# Patient Record
Sex: MALE | Race: AMERICAN INDIAN OR ALASKA NATIVE
[De-identification: names, ages, dates, MRNs, and addresses within clinical notes are randomized per-mention and may not be internally consistent; named-entity substitution may affect disease eponyms.]

---

## 2018-05-07 ENCOUNTER — HOSPITAL ENCOUNTER (EMERGENCY)
Dept: HOSPITAL 5 - ED | Age: 53
LOS: 1 days | Discharge: HOME | End: 2018-05-08
Payer: SELF-PAY

## 2018-05-07 DIAGNOSIS — I10: Primary | ICD-10-CM

## 2018-05-07 DIAGNOSIS — F17.200: ICD-10-CM

## 2018-05-07 PROCEDURE — 99284 EMERGENCY DEPT VISIT MOD MDM: CPT

## 2018-05-07 PROCEDURE — 70450 CT HEAD/BRAIN W/O DYE: CPT

## 2018-05-07 PROCEDURE — 81001 URINALYSIS AUTO W/SCOPE: CPT

## 2018-05-08 VITALS — DIASTOLIC BLOOD PRESSURE: 104 MMHG | SYSTOLIC BLOOD PRESSURE: 168 MMHG

## 2018-05-08 LAB
BILIRUB UR QL STRIP: (no result)
BLOOD UR QL VISUAL: (no result)
PH UR STRIP: 5 [PH] (ref 5–7)
PROT UR STRIP-MCNC: (no result) MG/DL
RBC #/AREA URNS HPF: 2 /HPF (ref 0–6)
UROBILINOGEN UR-MCNC: < 2 MG/DL (ref ?–2)
WBC #/AREA URNS HPF: 2 /HPF (ref 0–6)
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
## 2018-05-08 NOTE — CAT SCAN REPORT
FINAL REPORT



EXAM:  CT HEAD/BRAIN WO CON



HISTORY:  headache 



TECHNIQUE:  Routine axial imaging was obtained of the brain

without IV contrast.



FINDINGS:  

The ventricular system is appropriate in size and is symmetric.

There is no evidence of acute stroke or hemorrhage. The basal

cisterns appear normal. The visualized sinuses are clear. The

mastoid air cells are well pneumatized. The calvarium appears

normal.



IMPRESSION:  

Within normal limits.

## 2018-09-02 ENCOUNTER — HOSPITAL ENCOUNTER (INPATIENT)
Dept: HOSPITAL 5 - ED | Age: 53
LOS: 3 days | Discharge: HOME | DRG: 280 | End: 2018-09-05
Attending: INTERNAL MEDICINE | Admitting: INTERNAL MEDICINE
Payer: SELF-PAY

## 2018-09-02 DIAGNOSIS — I08.1: ICD-10-CM

## 2018-09-02 DIAGNOSIS — Z91.14: ICD-10-CM

## 2018-09-02 DIAGNOSIS — I16.1: ICD-10-CM

## 2018-09-02 DIAGNOSIS — Z82.49: ICD-10-CM

## 2018-09-02 DIAGNOSIS — I50.21: ICD-10-CM

## 2018-09-02 DIAGNOSIS — F17.210: ICD-10-CM

## 2018-09-02 DIAGNOSIS — I13.0: ICD-10-CM

## 2018-09-02 DIAGNOSIS — I21.A1: Primary | ICD-10-CM

## 2018-09-02 DIAGNOSIS — Z71.89: ICD-10-CM

## 2018-09-02 DIAGNOSIS — N18.9: ICD-10-CM

## 2018-09-02 DIAGNOSIS — N17.0: ICD-10-CM

## 2018-09-02 DIAGNOSIS — Z71.6: ICD-10-CM

## 2018-09-02 DIAGNOSIS — E87.6: ICD-10-CM

## 2018-09-02 LAB
ALBUMIN SERPL-MCNC: 3.7 G/DL (ref 3.9–5)
ALT SERPL-CCNC: 25 UNITS/L (ref 7–56)
APTT BLD: 30.9 SEC. (ref 24.2–36.6)
BASOPHILS # (AUTO): 0.1 K/MM3 (ref 0–0.1)
BASOPHILS NFR BLD AUTO: 1 % (ref 0–1.8)
BILIRUB DIRECT SERPL-MCNC: < 0.2 MG/DL (ref 0–0.2)
BUN SERPL-MCNC: 26 MG/DL (ref 9–20)
BUN/CREAT SERPL: 10 %
CALCIUM SERPL-MCNC: 9.2 MG/DL (ref 8.4–10.2)
EOSINOPHIL # BLD AUTO: 0.2 K/MM3 (ref 0–0.4)
EOSINOPHIL NFR BLD AUTO: 1.7 % (ref 0–4.3)
HCT VFR BLD CALC: 34.1 % (ref 35.5–45.6)
HDLC SERPL-MCNC: 31 MG/DL (ref 40–59)
HEMOLYSIS INDEX: 48
HGB BLD-MCNC: 12 GM/DL (ref 11.8–15.2)
INR PPP: 1.11 (ref 0.87–1.13)
LYMPHOCYTES # BLD AUTO: 1.7 K/MM3 (ref 1.2–5.4)
LYMPHOCYTES NFR BLD AUTO: 19.1 % (ref 13.4–35)
MCH RBC QN AUTO: 33 PG (ref 28–32)
MCHC RBC AUTO-ENTMCNC: 35 % (ref 32–34)
MCV RBC AUTO: 93 FL (ref 84–94)
MONOCYTES # (AUTO): 0.5 K/MM3 (ref 0–0.8)
MONOCYTES % (AUTO): 5.8 % (ref 0–7.3)
PLATELET # BLD: 237 K/MM3 (ref 140–440)
RBC # BLD AUTO: 3.67 M/MM3 (ref 3.65–5.03)

## 2018-09-02 PROCEDURE — 84100 ASSAY OF PHOSPHORUS: CPT

## 2018-09-02 PROCEDURE — 36415 COLL VENOUS BLD VENIPUNCTURE: CPT

## 2018-09-02 PROCEDURE — A9502 TC99M TETROFOSMIN: HCPCS

## 2018-09-02 PROCEDURE — 85610 PROTHROMBIN TIME: CPT

## 2018-09-02 PROCEDURE — 85730 THROMBOPLASTIN TIME PARTIAL: CPT

## 2018-09-02 PROCEDURE — 86160 COMPLEMENT ANTIGEN: CPT

## 2018-09-02 PROCEDURE — 93005 ELECTROCARDIOGRAM TRACING: CPT

## 2018-09-02 PROCEDURE — 82310 ASSAY OF CALCIUM: CPT

## 2018-09-02 PROCEDURE — 80061 LIPID PANEL: CPT

## 2018-09-02 PROCEDURE — 82550 ASSAY OF CK (CPK): CPT

## 2018-09-02 PROCEDURE — 84300 ASSAY OF URINE SODIUM: CPT

## 2018-09-02 PROCEDURE — 82570 ASSAY OF URINE CREATININE: CPT

## 2018-09-02 PROCEDURE — 86021 WBC ANTIBODY IDENTIFICATION: CPT

## 2018-09-02 PROCEDURE — 85025 COMPLETE CBC W/AUTO DIFF WBC: CPT

## 2018-09-02 PROCEDURE — 78452 HT MUSCLE IMAGE SPECT MULT: CPT

## 2018-09-02 PROCEDURE — 93010 ELECTROCARDIOGRAM REPORT: CPT

## 2018-09-02 PROCEDURE — 82330 ASSAY OF CALCIUM: CPT

## 2018-09-02 PROCEDURE — 96374 THER/PROPH/DIAG INJ IV PUSH: CPT

## 2018-09-02 PROCEDURE — 84484 ASSAY OF TROPONIN QUANT: CPT

## 2018-09-02 PROCEDURE — 83520 IMMUNOASSAY QUANT NOS NONAB: CPT

## 2018-09-02 PROCEDURE — 93306 TTE W/DOPPLER COMPLETE: CPT

## 2018-09-02 PROCEDURE — 86038 ANTINUCLEAR ANTIBODIES: CPT

## 2018-09-02 PROCEDURE — 82040 ASSAY OF SERUM ALBUMIN: CPT

## 2018-09-02 PROCEDURE — 87806 HIV AG W/HIV1&2 ANTB W/OPTIC: CPT

## 2018-09-02 PROCEDURE — 82553 CREATINE MB FRACTION: CPT

## 2018-09-02 PROCEDURE — 83880 ASSAY OF NATRIURETIC PEPTIDE: CPT

## 2018-09-02 PROCEDURE — 71046 X-RAY EXAM CHEST 2 VIEWS: CPT

## 2018-09-02 PROCEDURE — 93017 CV STRESS TEST TRACING ONLY: CPT

## 2018-09-02 PROCEDURE — 76770 US EXAM ABDO BACK WALL COMP: CPT

## 2018-09-02 PROCEDURE — 84156 ASSAY OF PROTEIN URINE: CPT

## 2018-09-02 PROCEDURE — 84165 PROTEIN E-PHORESIS SERUM: CPT

## 2018-09-02 PROCEDURE — 80074 ACUTE HEPATITIS PANEL: CPT

## 2018-09-02 PROCEDURE — 80048 BASIC METABOLIC PNL TOTAL CA: CPT

## 2018-09-02 RX ADMIN — METOPROLOL TARTRATE SCH MG: 25 TABLET, FILM COATED ORAL at 21:34

## 2018-09-02 RX ADMIN — Medication SCH ML: at 21:36

## 2018-09-02 RX ADMIN — ASPIRIN SCH MG: 325 TABLET ORAL at 09:55

## 2018-09-02 RX ADMIN — FUROSEMIDE SCH MG: 10 INJECTION, SOLUTION INTRAVENOUS at 18:45

## 2018-09-02 RX ADMIN — FUROSEMIDE SCH: 10 INJECTION, SOLUTION INTRAVENOUS at 07:56

## 2018-09-02 RX ADMIN — METOPROLOL TARTRATE SCH MG: 25 TABLET, FILM COATED ORAL at 09:55

## 2018-09-02 RX ADMIN — ENOXAPARIN SODIUM SCH MG: 100 INJECTION SUBCUTANEOUS at 09:56

## 2018-09-02 RX ADMIN — Medication SCH ML: at 09:56
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
## 2018-09-02 NOTE — EVENT NOTE
Date: 09/02/18


Patient seen and evaluated medical records reviewed


Admitted this morning with chest tightness and shortness of breath


Patient feels slightly better denies any chest pain this morning


Vital signs reviewed


Physical examination unremarkable





Assessment and plan:


--Chest pain/tightness;


Evaluate for acute coronary syndrome, serial cardiac enzymes, echocardiogram


Continue current cardiac medications, cardiology evaluation


Possible stress test prior to discharge





--Elevated cardiac enzymes; probably nonspecific, multiple risk factors


Cardiology following, possible stress test prior to discharge if needed





--Congestive heart failure; unknown ejection fraction


Continue anti-failure medications, input output monitoring, low sodium diet





--Hypertension; moderate control, continue current antihypertensives


And when necessary hydralazine





--Acute kidney injury; vasomotor nephropathy


Gentle hydration, avoid nephrotoxins, neurology consult if needed





--Ongoing tobacco use; smoking cessation counseling and nicotine patch as needed





-DVT prophylaxis; Lovenox





Closely monitor the patient and adjust the management as needed


Possible discharge in 1-2 days if stable-

## 2018-09-02 NOTE — XRAY REPORT
FINAL REPORT



EXAM:  XR CHEST ROUTINE 2V



HISTORY:  Shortness of breath 



TECHNIQUE:  PA and lateral views of the chest were obtained.



FINDINGS:  

The heart is mildly enlarged. The lungs reveal diffuse

interstitial prominence with thickening of the fissures.

Congestive heart failure is suspected. There is minimal pleural

fluid in 1 of the costophrenic sulci. The skeletal structures

otherwise appear well maintained. 



IMPRESSION:  

Cardiomegaly with congestive heart failure pattern.

## 2018-09-02 NOTE — HISTORY AND PHYSICAL REPORT
History of Present Illness


Date of examination: 09/02/18


History of present illness: 


52-year-old man with a history of hypertension, noncompliant with medication at 

times can't emergency room with complaints of shortness of breath, PND, 

orthopnea times one week.  Also complaining his abdomen feeling tight


Review of systems


Constitutional: no  weight loss, chills, fever


Ears, eyes, nose, mouth and throat: no nasal congestion, no nasal discharge, no 

sinus pressure, no vision change, no red eye.


Neck: No neck pain or rigidity.


Cardiovascular: no chest pain, palpitations


Respiratory: no cough


Gastrointestinal: no abdominal pain hematochezia


Genitourinary : no  frequency , no hematuria


Musculoskeletal: no joint swelling or muscle ache 


Integumentary: no rash, no pruritis


Neurological: no parathesias, no numbness, no focal weakness


Endocrine: no cold or heat intolerance, no polyuria or polydipsia


Hematologic/Lymphatic: no easy bruising, no easy bleeding, no gland swelling


Allergic/Immunologic: no urticaria, no angioedema.





PAST MEDICAL HISTORY: Hypertension





PAST SURGICAL HISTORY: Left leg





SOCIAL HISTORY: No alcohol, no drugs, smoke 1/2 pack/ day





FAMILY HISTORY: Hypertension








Medications and Allergies


 Allergies











Allergy/AdvReac Type Severity Reaction Status Date / Time


 


No Known Allergies Allergy   Verified 05/07/18 23:34











 Home Medications











 Medication  Instructions  Recorded  Confirmed  Last Taken  Type


 


Lisinopril [Zestril] 20 mg PO QDAY #30 tablet 05/08/18  Unknown Rx














Exam





- Physical Exam


Narrative exam: 


Gen. appearance: Patient lying in bed, no apparent distress


HEENT: Normocephalic, atraumatic, pupils equally round and reactive to light,  

extraocular movement intact, and no sclericterus,. No JVD or thyromegaly or 

nodule,neck supple, no carotid bruit ,mucous membranes moist, no exudate or 

erythema


Heart: S1, S2, regular rate and rhythm


Lungs: Crackles, breathing comfortable


Abdomen: Positive bowel sounds, non-tender, nondistended, no organomegaly


Extremity:no edema cyanosis, clubbing


Skin:  no rash, dry, warm


Neuro: Oriented 3, cranial nerves II-12 intact, speech is fluent, motor and 

sensory intact














- Constitutional


Vitals: 


 











Temp Pulse Resp BP Pulse Ox


 


 98.4 F   83   18   157/107   100 


 


 09/02/18 03:18  09/02/18 05:02  09/02/18 03:18  09/02/18 05:02  09/02/18 03:18














Results





- Labs


CBC & Chem 7: 


 09/02/18 03:24





 09/02/18 03:24


Labs: 


 Abnormal lab results











  09/02/18 09/02/18 09/02/18 Range/Units





  03:24 03:24 04:45 


 


Hct   34.1 L   (35.5-45.6)  %


 


MCH   33 H   (28-32)  pg


 


MCHC   35 H   (32-34)  %


 


Seg Neutrophils %   72.4 H   (40.0-70.0)  %


 


BUN  26 H    (9-20)  mg/dL


 


Creatinine  2.6 H    (0.8-1.5)  mg/dL


 


Total Creatine Kinase    204 H  ()  units/L


 


Troponin T  0.045 H   0.040 H  (0.00-0.029)  ng/mL


 


NT-Pro-B Natriuret Pep    5821 H  (0-900)  pg/mL


 


Albumin     (3.9-5)  g/dL














  09/02/18 Range/Units





  04:45 


 


Hct   (35.5-45.6)  %


 


MCH   (28-32)  pg


 


MCHC   (32-34)  %


 


Seg Neutrophils %   (40.0-70.0)  %


 


BUN   (9-20)  mg/dL


 


Creatinine   (0.8-1.5)  mg/dL


 


Total Creatine Kinase   ()  units/L


 


Troponin T   (0.00-0.029)  ng/mL


 


NT-Pro-B Natriuret Pep   (0-900)  pg/mL


 


Albumin  3.7 L  (3.9-5)  g/dL














- Imaging and Cardiology


EKG: image reviewed


Chest x-ray: image reviewed





Assessment and Plan


Assessment


New-onset CHF, probably systolic


Hypertension malignant


Renal failure, Probably chronic


Noncompliance





Plan


Admit medicine


Diurese with IV Lasix, start beta blocker, aspirin


No ACE inhibitor secondary to the kidney failure


Monitor I's and O's, daily weights


Check echo, cardiac enzymes, consult cardiology


DVT prophylaxis
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
## 2018-09-03 LAB
ALBUMIN SERPL-MCNC: 4.8 G/DL (ref 3.9–5)
BASOPHILS # (AUTO): 0 K/MM3 (ref 0–0.1)
BASOPHILS NFR BLD AUTO: 0.6 % (ref 0–1.8)
BUN SERPL-MCNC: 23 MG/DL (ref 9–20)
BUN/CREAT SERPL: 9 %
CALCIUM SERPL-MCNC: 0.8 MG/DL (ref 8.4–10.2)
CALCIUM SERPL-MCNC: 9.9 MG/DL (ref 8.4–10.2)
EOSINOPHIL # BLD AUTO: 0.1 K/MM3 (ref 0–0.4)
EOSINOPHIL NFR BLD AUTO: 2.2 % (ref 0–4.3)
HCT VFR BLD CALC: 37 % (ref 35.5–45.6)
HEMOLYSIS INDEX: 19
HGB BLD-MCNC: 12.4 GM/DL (ref 11.8–15.2)
LYMPHOCYTES # BLD AUTO: 1.6 K/MM3 (ref 1.2–5.4)
LYMPHOCYTES NFR BLD AUTO: 27.3 % (ref 13.4–35)
MCH RBC QN AUTO: 32 PG (ref 28–32)
MCHC RBC AUTO-ENTMCNC: 33 % (ref 32–34)
MCV RBC AUTO: 95 FL (ref 84–94)
MONOCYTES # (AUTO): 0.3 K/MM3 (ref 0–0.8)
MONOCYTES % (AUTO): 5.5 % (ref 0–7.3)
PLATELET # BLD: 238 K/MM3 (ref 140–440)
RBC # BLD AUTO: 3.91 M/MM3 (ref 3.65–5.03)

## 2018-09-03 RX ADMIN — HYDRALAZINE HYDROCHLORIDE SCH MG: 25 TABLET, FILM COATED ORAL at 23:05

## 2018-09-03 RX ADMIN — Medication SCH ML: at 22:55

## 2018-09-03 RX ADMIN — FUROSEMIDE SCH MG: 10 INJECTION, SOLUTION INTRAVENOUS at 18:58

## 2018-09-03 RX ADMIN — METOPROLOL TARTRATE SCH MG: 25 TABLET, FILM COATED ORAL at 10:29

## 2018-09-03 RX ADMIN — HYDRALAZINE HYDROCHLORIDE PRN MG: 20 INJECTION INTRAMUSCULAR; INTRAVENOUS at 05:40

## 2018-09-03 RX ADMIN — HYDRALAZINE HYDROCHLORIDE SCH MG: 25 TABLET, FILM COATED ORAL at 14:42

## 2018-09-03 RX ADMIN — Medication SCH ML: at 10:28

## 2018-09-03 RX ADMIN — ASPIRIN SCH MG: 325 TABLET ORAL at 10:29

## 2018-09-03 RX ADMIN — ENOXAPARIN SODIUM SCH MG: 100 INJECTION SUBCUTANEOUS at 10:29

## 2018-09-03 RX ADMIN — METOPROLOL TARTRATE SCH MG: 25 TABLET, FILM COATED ORAL at 22:55

## 2018-09-03 RX ADMIN — HYDRALAZINE HYDROCHLORIDE PRN MG: 20 INJECTION INTRAMUSCULAR; INTRAVENOUS at 10:29

## 2018-09-03 RX ADMIN — FUROSEMIDE SCH MG: 10 INJECTION, SOLUTION INTRAVENOUS at 05:41

## 2018-09-03 NOTE — PROGRESS NOTE
Assessment and Plan





Patient is feeling better today.  Had good diuresis.  No chest pain or 

significant difficulty breathing.  Blood pressure is not well controlled.  Will 

add by mouth hydralazine.  We may consider increasing the carvedilol to 25 

twice a day also.  Echocardiogram shows ejection fraction of 35-40%.  This is 

explained to the patient and family members.  Continue current management.  

Will consider ischemic evaluation once he is more stable.





- Patient Problems


(1) ARF (acute renal failure)


Current Visit: Yes   Status: Acute   


Qualifiers: 


   Acute renal failure type: unspecified   Qualified Code(s): N17.9 - Acute 

kidney failure, unspecified   





(2) Elevated troponin level


Current Visit: Yes   Status: Acute   





(3) New onset of congestive heart failure


Current Visit: Yes   Status: Acute   





(4) Shortness of breath


Current Visit: Yes   Status: Acute   





(5) Uncontrolled hypertension


Current Visit: Yes   Status: Acute   





Subjective


Date of service: 09/03/18


Interval history: 





Patient is feeling better today.  He had good diuresis.  He has no chest pain.  

Multiple family members are in the room





Objective


 Vital Signs











  Temp Pulse Resp BP Pulse Ox


 


 09/03/18 11:20  98.0 F  83  20  182/106  97


 


 09/03/18 07:52  97.8 F  80  20  194/120  95


 


 09/03/18 05:40     180/114 


 


 09/03/18 04:52  98.3 F  86  20  180/114  97


 


 09/03/18 00:19  98.3 F  84  24  170/110  95


 


 09/02/18 23:00   79   


 


 09/02/18 22:00    18  


 


 09/02/18 21:34   78   158/94 


 


 09/02/18 19:30   70    98


 


 09/02/18 16:39  98.2 F  73  18  155/102  95


 


 09/02/18 13:43  97.7 F  74  18  127/73  94














- Physical Examination


General: Appears Well


Cardiac: Positive: Reg Rate and Rhythm


Lungs: Positive: clear to auscultation


Abdomen: Positive: Soft





- Labs and Meds


 Cardiac Enzymes











  09/02/18 Range/Units





  12:18 


 


CK-MB (CK-2)  3.1  (0.0-4.0)  ng/mL








 CBC











  09/03/18 Range/Units





  05:19 


 


WBC  5.8  (4.5-11.0)  K/mm3


 


RBC  3.91  (3.65-5.03)  M/mm3


 


Hgb  12.4  (11.8-15.2)  gm/dl


 


Hct  37.0  (35.5-45.6)  %


 


Plt Count  238  (140-440)  K/mm3


 


Lymph #  1.6  (1.2-5.4)  K/mm3


 


Mono #  0.3  (0.0-0.8)  K/mm3


 


Eos #  0.1  (0.0-0.4)  K/mm3


 


Baso #  0.0  (0.0-0.1)  K/mm3








 Comprehensive Metabolic Panel











  09/03/18 09/03/18 Range/Units





  05:19 07:29 


 


Sodium  140   (137-145)  mmol/L


 


Potassium  3.9   (3.6-5.0)  mmol/L


 


Chloride  95.0 L   ()  mmol/L


 


Carbon Dioxide  21 L   (22-30)  mmol/L


 


BUN  23 H   (9-20)  mg/dL


 


Creatinine  2.5 H   (0.8-1.5)  mg/dL


 


Glucose  100   ()  mg/dL


 


Calcium  0.8 L* D  9.9  D  (8.4-10.2)  mg/dL


 


Albumin   4.8  (3.9-5)  g/dL














- Imaging and Cardiology


EKG: image reviewed

## 2018-09-03 NOTE — PROGRESS NOTE
Assessment and Plan


Assessment and plan: 


--Chest pain/tightness;


Slightly improved ,serial cardiac enzymes, echocardiogram


Continue current management cardiology following


Possible stress test prior to discharge





--Elevated cardiac enzymes; probably nonspecific, multiple risk factors


Cardiology following, possible stress test prior to discharge if needed





--Acute systolic congestive heart failure;ejection fraction 35-40% 


Anti-failure medications, input-output monitoring, low sodium diet





--Accelerated Hypertension; continue current antihypertensives


Adjust the dosages and when necessary hydralazine





--Acute kidney injury; vasomotor nephropathy


Gentle hydration, avoid nephrotoxins, neurology consult if needed





--Ongoing tobacco use; smoking cessation counseling and nicotine patch as needed





-DVT prophylaxis; Lovenox





Closely monitor the patient and adjust the management as needed


Plan of care reviewed with the patient and the family member at bedside





Disposition; follow cardiology evaluation and recommendations


may DC home in 1-2 days if stable











History


Interval history: 


patient seen and examined medical records reviewed


Patient feels better no new complaints


Admitted with chest pain and pressure, significantly improved today


Alert awake oriented 3 vital signs reviewed





Hospitalist Physical





- Constitutional


Vitals: 


 











Temp Pulse Resp BP Pulse Ox


 


 97.8 F   80   20   194/120   95 


 


 09/03/18 07:52  09/03/18 07:52  09/03/18 07:52  09/03/18 07:52  09/03/18 07:52











General appearance: Present: no acute distress, well-nourished, obese





- EENT


Eyes: Present: PERRL, EOM intact





- Neck


Neck: Present: supple, normal ROM





- Respiratory


Respiratory effort: normal


Respiratory: bilateral: diminished, rales, negative: rhonchi, wheezing





- Cardiovascular


Rhythm: regular


Heart Sounds: Present: S1 & S2





- Extremities


Extremities: no ischemia, No edema





- Abdominal


General gastrointestinal: soft, non-tender, non-distended, normal bowel sounds





- Integumentary


Integumentary: Present: clear, warm





- Psychiatric


Psychiatric: appropriate mood/affect, cooperative





- Neurologic


Neurologic: CNII-XII intact, moves all extremities





Results





- Labs


CBC & Chem 7: 


 09/03/18 05:19





 09/03/18 05:19


Labs: 


 Laboratory Last Values











WBC  5.8 K/mm3 (4.5-11.0)   09/03/18  05:19    


 


RBC  3.91 M/mm3 (3.65-5.03)   09/03/18  05:19    


 


Hgb  12.4 gm/dl (11.8-15.2)   09/03/18  05:19    


 


Hct  37.0 % (35.5-45.6)   09/03/18  05:19    


 


MCV  95 fl (84-94)  H  09/03/18  05:19    


 


MCH  32 pg (28-32)   09/03/18  05:19    


 


MCHC  33 % (32-34)   09/03/18  05:19    


 


RDW  14.6 % (13.2-15.2)   09/03/18  05:19    


 


Plt Count  238 K/mm3 (140-440)   09/03/18  05:19    


 


Lymph % (Auto)  27.3 % (13.4-35.0)   09/03/18  05:19    


 


Mono % (Auto)  5.5 % (0.0-7.3)   09/03/18  05:19    


 


Eos % (Auto)  2.2 % (0.0-4.3)   09/03/18  05:19    


 


Baso % (Auto)  0.6 % (0.0-1.8)   09/03/18  05:19    


 


Lymph #  1.6 K/mm3 (1.2-5.4)   09/03/18  05:19    


 


Mono #  0.3 K/mm3 (0.0-0.8)   09/03/18  05:19    


 


Eos #  0.1 K/mm3 (0.0-0.4)   09/03/18  05:19    


 


Baso #  0.0 K/mm3 (0.0-0.1)   09/03/18  05:19    


 


Seg Neutrophils %  64.4 % (40.0-70.0)   09/03/18  05:19    


 


Seg Neutrophils #  3.7 K/mm3 (1.8-7.7)   09/03/18  05:19    


 


PT  14.8 Sec. (12.2-14.9)   09/02/18  04:45    


 


INR  1.11  (0.87-1.13)   09/02/18  04:45    


 


APTT  30.9 Sec. (24.2-36.6)   09/02/18  04:45    


 


Sodium  140 mmol/L (137-145)   09/03/18  05:19    


 


Potassium  3.9 mmol/L (3.6-5.0)   09/03/18  05:19    


 


Chloride  95.0 mmol/L ()  L  09/03/18  05:19    


 


Carbon Dioxide  21 mmol/L (22-30)  L  09/03/18  05:19    


 


Anion Gap  28 mmol/L  09/03/18  05:19    


 


BUN  23 mg/dL (9-20)  H  09/03/18  05:19    


 


Creatinine  2.5 mg/dL (0.8-1.5)  H  09/03/18  05:19    


 


Estimated GFR  33 ml/min  09/03/18  05:19    


 


BUN/Creatinine Ratio  9 %  09/03/18  05:19    


 


Glucose  100 mg/dL ()   09/03/18  05:19    


 


Calcium  9.9 mg/dL (8.4-10.2)  D 09/03/18  07:29    


 


Total Bilirubin  0.80 mg/dL (0.1-1.2)   09/02/18  04:45    


 


Direct Bilirubin  < 0.2 mg/dL (0-0.2)   09/02/18  04:45    


 


Indirect Bilirubin  0.6 mg/dL  09/02/18  04:45    


 


AST  24 units/L (5-40)   09/02/18  04:45    


 


ALT  25 units/L (7-56)   09/02/18  04:45    


 


Alkaline Phosphatase  99 units/L ()   09/02/18  04:45    


 


Total Creatine Kinase  211 units/L ()  H  09/02/18  12:18    


 


CK-MB (CK-2)  3.1 ng/mL (0.0-4.0)   09/02/18  12:18    


 


CK-MB (CK-2) Rel Index  1.4  (0-4)   09/02/18  12:18    


 


Troponin T  0.025 ng/mL (0.00-0.029)   09/02/18  12:18    


 


NT-Pro-B Natriuret Pep  5821 pg/mL (0-900)  H  09/02/18  04:45    


 


Total Protein  6.3 g/dL (6.3-8.2)   09/02/18  04:45    


 


Albumin  4.8 g/dL (3.9-5)   09/03/18  07:29    


 


Albumin/Globulin Ratio  1.4 %  09/02/18  04:45    


 


Triglycerides  178 mg/dL (2-149)  H  09/02/18  03:24    


 


Cholesterol  132 mg/dL ()   09/02/18  03:24    


 


LDL Cholesterol Direct  74 mg/dL ()   09/02/18  03:24    


 


HDL Cholesterol  31 mg/dL (40-59)  L  09/02/18  03:24    


 


Cholesterol/HDL Ratio  4.25 %  09/02/18  03:24

## 2018-09-04 LAB
BASOPHILS # (AUTO): 0 K/MM3 (ref 0–0.1)
BASOPHILS NFR BLD AUTO: 0.4 % (ref 0–1.8)
BUN SERPL-MCNC: 25 MG/DL (ref 9–20)
BUN/CREAT SERPL: 10 %
CALCIUM SERPL-MCNC: 9.6 MG/DL (ref 8.4–10.2)
EOSINOPHIL # BLD AUTO: 0.1 K/MM3 (ref 0–0.4)
EOSINOPHIL NFR BLD AUTO: 1.5 % (ref 0–4.3)
HCT VFR BLD CALC: 40.4 % (ref 35.5–45.6)
HEMOLYSIS INDEX: 93
HGB BLD-MCNC: 14 GM/DL (ref 11.8–15.2)
LYMPHOCYTES # BLD AUTO: 1.3 K/MM3 (ref 1.2–5.4)
LYMPHOCYTES NFR BLD AUTO: 18.3 % (ref 13.4–35)
MCH RBC QN AUTO: 32 PG (ref 28–32)
MCHC RBC AUTO-ENTMCNC: 35 % (ref 32–34)
MCV RBC AUTO: 93 FL (ref 84–94)
MONOCYTES # (AUTO): 0.5 K/MM3 (ref 0–0.8)
MONOCYTES % (AUTO): 7.6 % (ref 0–7.3)
PLATELET # BLD: 257 K/MM3 (ref 140–440)
RBC # BLD AUTO: 4.34 M/MM3 (ref 3.65–5.03)

## 2018-09-04 RX ADMIN — HYDRALAZINE HYDROCHLORIDE SCH MG: 25 TABLET, FILM COATED ORAL at 21:26

## 2018-09-04 RX ADMIN — HYDRALAZINE HYDROCHLORIDE PRN MG: 20 INJECTION INTRAMUSCULAR; INTRAVENOUS at 09:06

## 2018-09-04 RX ADMIN — ASPIRIN SCH MG: 325 TABLET ORAL at 09:06

## 2018-09-04 RX ADMIN — FUROSEMIDE SCH MG: 10 INJECTION, SOLUTION INTRAVENOUS at 17:52

## 2018-09-04 RX ADMIN — FUROSEMIDE SCH MG: 10 INJECTION, SOLUTION INTRAVENOUS at 06:52

## 2018-09-04 RX ADMIN — ENOXAPARIN SODIUM SCH MG: 100 INJECTION SUBCUTANEOUS at 09:06

## 2018-09-04 RX ADMIN — HYDRALAZINE HYDROCHLORIDE PRN MG: 20 INJECTION INTRAMUSCULAR; INTRAVENOUS at 06:51

## 2018-09-04 RX ADMIN — Medication SCH ML: at 09:10

## 2018-09-04 RX ADMIN — METOPROLOL TARTRATE SCH MG: 25 TABLET, FILM COATED ORAL at 09:05

## 2018-09-04 RX ADMIN — METOPROLOL TARTRATE SCH MG: 25 TABLET, FILM COATED ORAL at 21:25

## 2018-09-04 RX ADMIN — Medication SCH ML: at 21:30

## 2018-09-04 RX ADMIN — HYDRALAZINE HYDROCHLORIDE SCH MG: 25 TABLET, FILM COATED ORAL at 14:22

## 2018-09-04 RX ADMIN — HYDRALAZINE HYDROCHLORIDE SCH: 25 TABLET, FILM COATED ORAL at 23:55

## 2018-09-04 NOTE — CONSULTATION
History of Present Illness





- Reason for Consult


Consult date: 09/04/18


acute renal failure, chronic renal failure, accelerated hypertension





- History of Present Illness





This is a 53 year old  male who has been admitted to the 

hospital since 09/02/18 with a chief complaint of shortness of breath that was 

present 1 week prior to coming to this E.R. Patient also reports left leg 

edema. On evaluation a Chest x-ray was obtained that revealed- Cardiomegaly 

with congestive heart failure pattern and Echocardiogram was completed that 

revealed LVEF 35-40%. Patient was started on Lasix 40 mg IV BID. Patient was 

also noted to be in Hypertensive Emergency with an elevated blood pressure of 

212/142. Pertinent labs revealed an elevated serum creatinine of 2.6. Baseline 

serum creatinine unknown. We are being consulted for management of this patient'

s Advanced Renal Failure.














Past History


Past Medical History: heart failure, hypertension


Past Surgical History: No surgical history


Social history: no significant social history


Family history: no significant family history





Medications and Allergies


 Allergies











Allergy/AdvReac Type Severity Reaction Status Date / Time


 


No Known Allergies Allergy   Verified 05/07/18 23:34











 Home Medications











 Medication  Instructions  Recorded  Confirmed  Last Taken  Type


 


Lisinopril [Zestril] 20 mg PO QDAY #30 tablet 05/08/18 09/04/18 Unknown Rx











Active Meds: 


Active Medications





Acetaminophen (Tylenol)  650 mg PO Q4H PRN


   PRN Reason: Pain MILD(1-3)/Fever >100.5/HA


Aspirin (Aspirin)  325 mg PO QDAY [facility]


   Last Admin: 09/04/18 09:06 Dose:  325 mg


Atorvastatin Calcium (Lipitor)  40 mg PO QHS [facility]


   Last Admin: 09/03/18 22:55 Dose:  40 mg


Enoxaparin Sodium (Lovenox)  40 mg SUB-Q QDAY@1000 [facility]


   Last Admin: 09/04/18 09:06 Dose:  40 mg


Furosemide (Lasix)  40 mg IV BID@0600,1800 [facility]


   Last Admin: 09/04/18 06:52 Dose:  40 mg


Hydralazine HCl (Apresoline)  10 mg IV Q4HR PRN


   PRN Reason: Hypertension 


   Last Admin: 09/04/18 09:06 Dose:  10 mg


Hydralazine HCl (Apresoline)  75 mg PO Q8HR [facility]


Metoprolol Tartrate (Lopressor)  50 mg PO BID [facility]


Ondansetron HCl (Zofran)  4 mg IV Q8H PRN


   PRN Reason: Nausea And Vomiting


Sodium Chloride (Sodium Chloride Flush Syringe 10 Ml)  10 ml IV BID [facility]


   Last Admin: 09/04/18 09:10 Dose:  10 ml


Sodium Chloride (Sodium Chloride Flush Syringe 10 Ml)  10 ml IV PRN PRN


   PRN Reason: LINE FLUSH











Review of Systems


Constitutional: no weight loss, no weight gain, no fever, no chills, no sweats


Ears, nose, mouth and throat: no ear pain, no ear discharge, no tinnitis, no 

decreased hearing, no nasal congestion, no nasal discharge


Cardiovascular: edema, shortness of breath, dyspnea on exertion, high blood 

pressure, leg edema, no chest pain, no orthopnea, no palpitations, no rapid/

irregular heart beat


Respiratory: shortness of breath, dyspnea on exertion, no cough with sputum, no 

excessive sputum, no hemoptysis


Gastrointestinal: no abdominal pain, no nausea, no vomiting, no diarrhea, no 

constipation, no change in bowel habits, no hematemesis


Genitourinary Male: no hematuria, no flank pain, no discharge, no urinary 

frequency, no urinary hesitancy, no nocturia


Musculoskeletal: no neck pain, no shooting arm pain, no arm numbness/tingling, 

no low back pain, no shooting leg pain, no leg numbness/tingling, no redness of 

joints


Integumentary: no rash, no pruritis, no redness, no sores, no wounds, no 

jaundice, no boils


Neurological: no transient paralysis, no paralysis, no weakness, no parathesias

, no numbness, no tingling, no seizures, no syncope


Psychiatric: no memory loss, no change in sleep habits, no sleep disturbances, 

no insomnia, no hypersomnia, no change in appetite, no change in libido, no 

suicidal ideation


Endocrine: no cold intolerance, no heat intolerance, no polyphagia, no 

excessive thirst, no polydipsia, no polyuria


Hematologic/Lymphatic: no easy bruising, no easy bleeding, no lymphadenopathy





Exam





- Vital Signs


Vital signs: 


 Vital Signs











Pulse Resp BP Pulse Ox


 


 89   15   138/85   94 


 


 09/01/18 23:52  09/01/18 23:52  09/01/18 23:52  09/01/18 23:52














- General Appearance


General appearance: well-developed, appears stated age, fatigue


EENT: ATNC, PERRL, hearing intact, vision intact


Neck: Present: neck supple, trachea midline


Respiratory: Decreased Breath Sounds


Heart: regular, S1S2


Gastrointestinal: Present: normoactive bowel sounds


Integumentary: warm and dry


Neurologic: alert and oriented x3


Musculoskeletal: Present: other (Edema to Left leg noted)


Psychiatric: agitated





Results





- Lab Results





 09/04/18 05:10





 09/04/18 05:10


 Most recent lab results











Calcium  9.6 mg/dL (8.4-10.2)   09/04/18  05:10    














Assessment and Plan





Acute on Chronic Kidney Disease likely secondary to Hypertensive 

Nephrosclerosis and Cardiorenal Syndrome:


-Renal function reviewed. Admission serum creatinine was 2.6, renal function 

remains stable since admission, current serum creatinine today is also 2.6


-Continue diuresis with Lasix 40 mg IV BID


-Obtain urine lytes


-Will do GN work-up if proteinuria present


-Obtain renal ultrasound


-Avoid Nephrotoxic agents


-Obtain daily weights


-Monitor I/O's


-Will monitor renal function closely











Acute systolic congestive heart failure:


-LVEF:35-40% 


-Continue on Lasix


-Cardiology onboard











Hypertension:


-On Lasix/Hydralazine/Metoprolol


-Adjust regimen as needed

## 2018-09-04 NOTE — ULTRASOUND REPORT
ULTRASOUND RENAL BILATERAL



HISTORY: Renal failure.



TECHNIQUE: transabdominal ultrasound with color Doppler interrogation.



COMPARISON: none.



FINDINGS:



The kidneys are normal size, contour and position.  There is increased 

renal cortical echotexture bilaterally consistent with nonspecific 

renal parenchymal disease.  Corticomedullary differentiation is 

preserved.



No evidence for cystic disease, mass, nephrolithiasis, hydronephrosis 

or perinephric fluid.



The views of the bladder and the region of the ureters appear normal.



IMPRESSION:

Renal parenchymal disease.

## 2018-09-04 NOTE — PROGRESS NOTE
Assessment and Plan


Assessment and plan: 


--Accelerated Hypertension; uncontrolled blood pressures


Increase hydralazine dose, add clonidine if needed, closely monitor 


Adjust the dosages and when necessary hydralazine





--Chest pain/tightness;


Slightly improved ,serial cardiac enzymes, echocardiogram


Continue current management cardiology following


Possible stress test tomorrow





--Elevated cardiac enzymes; probably nonspecific, multiple risk factors


Cardiology following, stress test tomorrow





--Acute systolic congestive heart failure;ejection fraction 35-40% 


Continue Lasix , beta blockers ,inhibitors in view of kidney injury 


input-output monitoring, low sodium diet





--Acute kidney injury versus acute on chronic kidney disease; 


vasomotor nephropathy Gentle hydration, avoid nephrotoxins,


Nephrology consult requested





--Ongoing tobacco use; smoking cessation counseling and nicotine patch as needed





-DVT prophylaxis; Lovenox





Closely monitor the patient and adjust the management as needed


Plan of care reviewed with the patient and the family member at bedside





Disposition; follow stress test and blood pressures tomorrow


If negative and stable may be discharged home


Plan of care is reviewed with the patient and his nurse


I also discussed with the cardiologist











History


Interval history: 


Patient seen and examined medical treatments reviewed


Feels better.  Denies any chest pain or shortness of breath


Labile blood pressures.  Antihypertensive suggested


Cardiology planning stress test more


Patient is alert awake oriented 3


Vital signs reviewed








Hospitalist Physical





- Constitutional


Vitals: 


 











Temp Pulse Resp BP Pulse Ox


 


 97.9 F   75   20   185/108   93 


 


 09/04/18 08:01  09/04/18 09:06  09/04/18 08:01  09/04/18 09:06  09/04/18 08:01











General appearance: Present: no acute distress, well-nourished, obese





- EENT


Eyes: Present: PERRL, EOM intact





- Neck


Neck: Present: supple, normal ROM





- Respiratory


Respiratory effort: normal


Respiratory: bilateral: diminished, negative: rales, rhonchi, wheezing





- Cardiovascular


Rhythm: regular


Heart Sounds: Present: S1 & S2





- Extremities


Extremities: no ischemia, No edema





- Abdominal


General gastrointestinal: soft, non-tender, non-distended, normal bowel sounds





- Integumentary


Integumentary: Present: clear, warm





- Psychiatric


Psychiatric: appropriate mood/affect, cooperative





- Neurologic


Neurologic: CNII-XII intact, moves all extremities





Results





- Labs


CBC & Chem 7: 


 09/04/18 05:10





 09/04/18 05:10


Labs: 


 Laboratory Last Values











WBC  7.2 K/mm3 (4.5-11.0)   09/04/18  05:10    


 


RBC  4.34 M/mm3 (3.65-5.03)   09/04/18  05:10    


 


Hgb  14.0 gm/dl (11.8-15.2)   09/04/18  05:10    


 


Hct  40.4 % (35.5-45.6)   09/04/18  05:10    


 


MCV  93 fl (84-94)   09/04/18  05:10    


 


MCH  32 pg (28-32)   09/04/18  05:10    


 


MCHC  35 % (32-34)  H  09/04/18  05:10    


 


RDW  14.1 % (13.2-15.2)   09/04/18  05:10    


 


Plt Count  257 K/mm3 (140-440)   09/04/18  05:10    


 


Lymph % (Auto)  18.3 % (13.4-35.0)   09/04/18  05:10    


 


Mono % (Auto)  7.6 % (0.0-7.3)  H  09/04/18  05:10    


 


Eos % (Auto)  1.5 % (0.0-4.3)   09/04/18  05:10    


 


Baso % (Auto)  0.4 % (0.0-1.8)   09/04/18  05:10    


 


Lymph #  1.3 K/mm3 (1.2-5.4)   09/04/18  05:10    


 


Mono #  0.5 K/mm3 (0.0-0.8)   09/04/18  05:10    


 


Eos #  0.1 K/mm3 (0.0-0.4)   09/04/18  05:10    


 


Baso #  0.0 K/mm3 (0.0-0.1)   09/04/18  05:10    


 


Seg Neutrophils %  72.2 % (40.0-70.0)  H  09/04/18  05:10    


 


Seg Neutrophils #  5.2 K/mm3 (1.8-7.7)   09/04/18  05:10    


 


PT  14.8 Sec. (12.2-14.9)   09/02/18  04:45    


 


INR  1.11  (0.87-1.13)   09/02/18  04:45    


 


APTT  30.9 Sec. (24.2-36.6)   09/02/18  04:45    


 


Sodium  142 mmol/L (137-145)   09/04/18  05:10    


 


Potassium  3.9 mmol/L (3.6-5.0)   09/04/18  05:10    


 


Chloride  100.2 mmol/L ()   09/04/18  05:10    


 


Carbon Dioxide  27 mmol/L (22-30)   09/04/18  05:10    


 


Anion Gap  19 mmol/L  09/04/18  05:10    


 


BUN  25 mg/dL (9-20)  H  09/04/18  05:10    


 


Creatinine  2.6 mg/dL (0.8-1.5)  H  09/04/18  05:10    


 


Estimated GFR  31 ml/min  09/04/18  05:10    


 


BUN/Creatinine Ratio  10 %  09/04/18  05:10    


 


Glucose  99 mg/dL ()   09/04/18  05:10    


 


Calcium  9.6 mg/dL (8.4-10.2)   09/04/18  05:10    


 


Total Bilirubin  0.80 mg/dL (0.1-1.2)   09/02/18  04:45    


 


Direct Bilirubin  < 0.2 mg/dL (0-0.2)   09/02/18  04:45    


 


Indirect Bilirubin  0.6 mg/dL  09/02/18  04:45    


 


AST  24 units/L (5-40)   09/02/18  04:45    


 


ALT  25 units/L (7-56)   09/02/18  04:45    


 


Alkaline Phosphatase  99 units/L ()   09/02/18  04:45    


 


Total Creatine Kinase  211 units/L ()  H  09/02/18  12:18    


 


CK-MB (CK-2)  3.1 ng/mL (0.0-4.0)   09/02/18  12:18    


 


CK-MB (CK-2) Rel Index  1.4  (0-4)   09/02/18  12:18    


 


Troponin T  0.025 ng/mL (0.00-0.029)   09/02/18  12:18    


 


NT-Pro-B Natriuret Pep  5821 pg/mL (0-900)  H  09/02/18  04:45    


 


Total Protein  6.3 g/dL (6.3-8.2)   09/02/18  04:45    


 


Albumin  4.8 g/dL (3.9-5)   09/03/18  07:29    


 


Albumin/Globulin Ratio  1.4 %  09/02/18  04:45    


 


Triglycerides  178 mg/dL (2-149)  H  09/02/18  03:24    


 


Cholesterol  132 mg/dL ()   09/02/18  03:24    


 


LDL Cholesterol Direct  74 mg/dL ()   09/02/18  03:24    


 


HDL Cholesterol  31 mg/dL (40-59)  L  09/02/18  03:24    


 


Cholesterol/HDL Ratio  4.25 %  09/02/18  03:24

## 2018-09-04 NOTE — PROGRESS NOTE
Assessment and Plan


Cont present management. No ACEI/ARB at this time in setting of renal 

insufficiency. 


Plan for lexiscan MPI stress test in AM. NPO after MN. 





The patient has been seen in conjunction with Dr. LYNETTE Degroot who agrees with the 

assessment and plan of care. 





- Patient Problems


(1) ARF (acute renal failure)


Current Visit: Yes   Status: Acute   


Qualifiers: 


   Acute renal failure type: unspecified   Qualified Code(s): N17.9 - Acute 

kidney failure, unspecified   





(2) Elevated troponin level


Current Visit: Yes   Status: Acute   





(3) New onset of congestive heart failure


Current Visit: Yes   Status: Acute   





(4) Shortness of breath


Current Visit: Yes   Status: Acute   





(5) Uncontrolled hypertension


Current Visit: Yes   Status: Acute   











Subjective


Date of service: 09/04/18


Principal diagnosis: HF


Interval history: 


pt resting in bed, c/o fatigue today. 








Objective





  Last Vital Signs











Temp  99.4 F   09/04/18 11:39


 


Pulse  82   09/04/18 11:39


 


Resp  20   09/04/18 11:39


 


BP  141/67   09/04/18 11:39


 


Pulse Ox  97   09/04/18 11:39

















- Physical Examination


General: Appears Well


HEENT: Positive: PERRL


Neck: Positive: neck supple, trachea midline


Cardiac: Positive: Reg Rate and Rhythm, S1/S2


Lungs: Positive: Decreased Breath Sounds


Neuro: Positive: Grossly Intact


Abdomen: Positive: Soft





- Labs and Meds


 CBC











  09/04/18 Range/Units





  05:10 


 


WBC  7.2  (4.5-11.0)  K/mm3


 


RBC  4.34  (3.65-5.03)  M/mm3


 


Hgb  14.0  (11.8-15.2)  gm/dl


 


Hct  40.4  (35.5-45.6)  %


 


Plt Count  257  (140-440)  K/mm3


 


Lymph #  1.3  (1.2-5.4)  K/mm3


 


Mono #  0.5  (0.0-0.8)  K/mm3


 


Eos #  0.1  (0.0-0.4)  K/mm3


 


Baso #  0.0  (0.0-0.1)  K/mm3








 Comprehensive Metabolic Panel











  09/04/18 Range/Units





  05:10 


 


Sodium  142  (137-145)  mmol/L


 


Potassium  3.9  (3.6-5.0)  mmol/L


 


Chloride  100.2  ()  mmol/L


 


Carbon Dioxide  27  (22-30)  mmol/L


 


BUN  25 H  (9-20)  mg/dL


 


Creatinine  2.6 H  (0.8-1.5)  mg/dL


 


Glucose  99  ()  mg/dL


 


Calcium  9.6  (8.4-10.2)  mg/dL














- Imaging and Cardiology


EKG: image reviewed


Echo: report reviewed (9/3/2018: EF 35-40%, mod LVH, impaired relaxation, RV 

systolic function mildly reduced, mod MR, trace TR)





- Telemetry


EKG Rhythm: Sinus Rhythm

## 2018-09-05 VITALS — SYSTOLIC BLOOD PRESSURE: 151 MMHG | DIASTOLIC BLOOD PRESSURE: 94 MMHG

## 2018-09-05 LAB
BASOPHILS # (AUTO): 0 K/MM3 (ref 0–0.1)
BASOPHILS NFR BLD AUTO: 0.5 % (ref 0–1.8)
BUN SERPL-MCNC: 27 MG/DL (ref 9–20)
BUN/CREAT SERPL: 10 %
CALCIUM SERPL-MCNC: 9.2 MG/DL (ref 8.4–10.2)
CREATININE,URINE: 245.8 MG/DL (ref 0.1–20)
EOSINOPHIL # BLD AUTO: 0 K/MM3 (ref 0–0.4)
EOSINOPHIL NFR BLD AUTO: 0.5 % (ref 0–4.3)
HCT VFR BLD CALC: 39.7 % (ref 35.5–45.6)
HEMOLYSIS INDEX: 7
HGB BLD-MCNC: 13.8 GM/DL (ref 11.8–15.2)
LYMPHOCYTES # BLD AUTO: 1.5 K/MM3 (ref 1.2–5.4)
LYMPHOCYTES NFR BLD AUTO: 17.5 % (ref 13.4–35)
MCH RBC QN AUTO: 32 PG (ref 28–32)
MCHC RBC AUTO-ENTMCNC: 35 % (ref 32–34)
MCV RBC AUTO: 93 FL (ref 84–94)
MONOCYTES # (AUTO): 0.7 K/MM3 (ref 0–0.8)
MONOCYTES % (AUTO): 8.1 % (ref 0–7.3)
PLATELET # BLD: 268 K/MM3 (ref 140–440)
RBC # BLD AUTO: 4.29 M/MM3 (ref 3.65–5.03)

## 2018-09-05 RX ADMIN — ASPIRIN SCH MG: 325 TABLET ORAL at 10:44

## 2018-09-05 RX ADMIN — ENOXAPARIN SODIUM SCH MG: 100 INJECTION SUBCUTANEOUS at 10:43

## 2018-09-05 RX ADMIN — Medication SCH ML: at 10:45

## 2018-09-05 RX ADMIN — FUROSEMIDE SCH MG: 10 INJECTION, SOLUTION INTRAVENOUS at 06:56

## 2018-09-05 RX ADMIN — HYDRALAZINE HYDROCHLORIDE SCH MG: 25 TABLET, FILM COATED ORAL at 06:55

## 2018-09-05 RX ADMIN — METOPROLOL TARTRATE SCH MG: 25 TABLET, FILM COATED ORAL at 10:44

## 2018-09-05 NOTE — PROGRESS NOTE
Assessment and Plan





acute systolic HF


Acute renal insufficency


htn uncontrolled


chol


nstemi type 2





rec: increase hydralzine 100mg tid, add norvasc 5mg and add imdur 30mg and cont 

lopressor and lasix 40mg daily, no ischemia on stress, probably non ischemic cmp

, if bp better this afternoon may discharge from [facility] point of view 





Subjective


Date of service: 09/05/18


Principal diagnosis: HF


Interval history: 





pt denies any sob today 





Objective


 Vital Signs











  Temp Pulse Resp BP BP Pulse Ox


 


 09/05/18 07:35  98.0 F  82  20  186/101   94


 


 09/05/18 06:55   85   185/108  


 


 09/05/18 05:26  98.4 F   20   


 


 09/05/18 04:52   81   185/108   96


 


 09/05/18 00:28  98.3 F   20   


 


 09/04/18 23:51   76   162/101   98


 


 09/04/18 23:49   73     97


 


 09/04/18 21:36    20   


 


 09/04/18 21:26   93 H   191/121  


 


 09/04/18 21:25   93 H   191/121  


 


 09/04/18 20:30   83    


 


 09/04/18 20:20   89   191/121   96


 


 09/04/18 16:56  98.0 F  81  20   164/80  96


 


 09/04/18 16:55  99.0 F  70  20   126/72  95


 


 09/04/18 14:22   82   141/67  


 


 09/04/18 11:39  99.4 F  82  20  141/67   97














- Physical Examination


General: Appears Well


HEENT: Positive: PERRL


Neck: Positive: neck supple, trachea midline


Cardiac: Positive: Reg Rate and Rhythm


Lungs: Positive: clear to auscultation


Neuro: Positive: Grossly Intact


Abdomen: Positive: Soft


Extremities: Absent: edema





- Labs and Meds


 CBC











  09/05/18 Range/Units





  05:53 


 


WBC  8.8  (4.5-11.0)  K/mm3


 


RBC  4.29  (3.65-5.03)  M/mm3


 


Hgb  13.8  (11.8-15.2)  gm/dl


 


Hct  39.7  (35.5-45.6)  %


 


Plt Count  268  (140-440)  K/mm3


 


Lymph #  1.5  (1.2-5.4)  K/mm3


 


Mono #  0.7  (0.0-0.8)  K/mm3


 


Eos #  0.0  (0.0-0.4)  K/mm3


 


Baso #  0.0  (0.0-0.1)  K/mm3








 Comprehensive Metabolic Panel











  09/05/18 Range/Units





  05:53 


 


Sodium  138  (137-145)  mmol/L


 


Potassium  3.3 L  (3.6-5.0)  mmol/L


 


Chloride  97.4 L  ()  mmol/L


 


Carbon Dioxide  25  (22-30)  mmol/L


 


BUN  27 H  (9-20)  mg/dL


 


Creatinine  2.6 H  (0.8-1.5)  mg/dL


 


Glucose  98  ()  mg/dL


 


Calcium  9.2  (8.4-10.2)  mg/dL














- Imaging and Cardiology


EKG: image reviewed


Pharmacologic stress test: report reviewed (no signficant ischemia noted ef 30% 

probably non ischemic cmp)


Echo: report reviewed (9/3/2018: EF 35-40%, mod LVH, impaired relaxation, RV 

systolic function mildly reduced, mod MR, trace TR)





- Telemetry


EKG Rhythm: Sinus Rhythm

## 2018-09-05 NOTE — PROGRESS NOTE
Assessment and Plan





Acute on Chronic Kidney Disease likely secondary to Hypertensive 

Nephrosclerosis and Cardiorenal Syndrome:


-Renal function reviewed. Renal function remains stable, current serum 

creatinine 2.6, likely secondary to Hypertensive Nephrosclerosis but will rule 

out GN


-Renal Ultrasound reviewed- Renal parenchymal disease. No Hydronephrosis. 


-Obtain urine lytes- urine protein, urine creatinine and urine sodium-not yet 

collected 


-Obtain CAT, ANCA, C3, C4, HIV, Hep panel, SPEP, Anti-GBM today


-Avoid Nephrotoxic agents


-Obtain daily weights


-Monitor I/O's


-Will monitor renal function closely


-Patient requesting to be discharged, patient to complete blood work above 

prior to discharge and patient will need to follow-up with us in 7 days of 

discharge at [facility]: [address] [place] [address] 

[id number]. Phone: [phone].











Acute systolic congestive heart failure:


-LVEF:35-40% 


-Scheduled for stress test today


-Continue on Lasix 40 mg po daily


-Cardiology onboard











Hypertension:


-On Lasix/Amlodipine/Hydralazine/Metoprolol


-Increase Amlodipine to 10 mg po daily


-Adjust regimen as needed 








Hypokalemia:


-Gentle repletion with KCl 10 meq po x 1





Subjective


Date of service: 09/05/18


Principal diagnosis: HF


Interval history: 





Patient seen lying in bed with multiple family members at bedside. Patient 

agitated stating he wants to go home. States he is  and is losing 

money and that he has child support to pay.





Objective





- Vital Signs


Vital signs: 


 Vital Signs - 12hr











  09/04/18 09/04/18 09/05/18





  23:49 23:51 00:28


 


Temperature   98.3 F


 


Pulse Rate 73 76 


 


Respiratory   20





Rate   


 


Blood Pressure  162/101 


 


O2 Sat by Pulse 97 98 





Oximetry   














  09/05/18 09/05/18 09/05/18





  04:52 05:26 06:55


 


Temperature  98.4 F 


 


Pulse Rate 81  85


 


Respiratory  20 





Rate   


 


Blood Pressure 185/108  185/108


 


O2 Sat by Pulse 96  





Oximetry   














  09/05/18





  07:35


 


Temperature 98.0 F


 


Pulse Rate 82


 


Respiratory 20





Rate 


 


Blood Pressure 186/101


 


O2 Sat by Pulse 94





Oximetry 














- General Appearance


General appearance: well-developed, appears stated age, fatigue


EENT: ATNC, PERRL, hearing intact, vision intact


Neck: no JVD, supple


Respiratory: Present: Clear to Ascultation


Cardiology: regular, S1S2


Gastrointestinal: normoactive bowel sounds


Integumentary: warm and dry


Neurologic: alert and oriented x3


Musculoskeletal: other (Left leg with mild edema)


Psychiatric: agitated





- Lab





 09/05/18 05:53





 09/05/18 05:53


 Most recent lab results











Calcium  9.2 mg/dL (8.4-10.2)   09/05/18  05:53    


 


Phosphorus  3.40 mg/dL (2.5-4.5)   09/05/18  05:53

## 2018-09-05 NOTE — TREADMILL REPORT
NUCLEAR CARDIAC PERFUSION STUDY



REASON FOR STUDY:  Abnormal troponin and shortness of breath.



IMAGING PROTOCOL:  The patient received 10 mCi of Technetium 99m Tetrofosmin for

resting image and 28 mCi of Technetium 99m Tetrofosmin for stress imaging.  The

imaging for the whole procedure was completed 30-90 minutes following the

initial injection of Technetium 99m Tetrofosmin.  The SPECT imaging in the 180

degree arc was performed in the right anterior oblique projection.  Computerized

reconstruction of the images was performed for analysis.



IMAGING RESULTS:  Cavity is mildly dilated on both stress and rest. 

Distribution radionuclide is normal in the anterior, inferior, septal, and

apical regions.  Gated SPECT shows moderate global hypokinesis, EF 30% with

global hypokinesis.  The patient infused Lexiscan with no EKG changes.



SUMMARY:

1.  Negative Lexiscan EKG.

2.  No significant myocardial ischemia noted, but moderate left ventricular

dysfunction, ejection fraction 30%.  We would suggest echo for quantification of

left ventricular function, but no significant ischemia noted.





DD: 09/05/2018 10:04

DT: 09/05/2018 10:51

[facility]# [id number]  [id number]

CARLENE/SABINA

## 2018-09-07 LAB
ALBUMIN SERPL-MCNC: 3.9 G/DL (ref 3.8–4.8)
GAMMA GLOB SERPL ELPH-MCNC: 1.4 G/DL (ref 0.8–1.7)